# Patient Record
Sex: MALE | Race: WHITE
[De-identification: names, ages, dates, MRNs, and addresses within clinical notes are randomized per-mention and may not be internally consistent; named-entity substitution may affect disease eponyms.]

---

## 2022-10-07 ENCOUNTER — HOSPITAL ENCOUNTER (OUTPATIENT)
Dept: HOSPITAL 95 - LAB | Age: 22
Discharge: HOME | End: 2022-10-07
Attending: ADVANCED PRACTICE MIDWIFE
Payer: COMMERCIAL

## 2022-10-07 DIAGNOSIS — Z11.3: Primary | ICD-10-CM

## 2024-12-09 NOTE — NUR
ADMISSION/SHIFT SUMMARY:
 
PT ADMITTED F/ER, ARRIVES TO UNIT APPROX 1310, TRANSFERS SELF F/GURNEY TO BED
W/OUT DIFFICULTY. PT IS A&Ox4, ANSWERS QUESTIONS APPROPRIATELY, IS COOPERATIVE
W/CARE. PT REPORTS IMPROVED SOB SINCE ADMISSION, THORAVENT CONTINUES TO R
CHEST WALL, SET TO SUCTION AT -30 CMH20, MINIMAL RED OUTPUT IN TUBING. PT
ENDORSES PAIN AROUND INSERTION SITE, MEDICATED x1 PER EMAR, TOLERATING WELL.
DR SEWELL TO BEDSIDE THIS EVENING, CURRENT PLAN IS TO MONITOR PT OVERNIGHT AND
REPEAT CHEST XRAY IN AM. AT THIS TIME, PT IS RESTING IN BED W/VISITORS IN
ROOM. WILL CONTINUE TO MONITOR AND TREAT ACCORDINGLY UNTIL CHANGE OF SHIFT.

## 2024-12-10 NOTE — NUR
SHIFT SUMMARY
PATIENT ALERT AND ORIENTED X4. HAD NO COMPLAINTS OF PAIN OR SHORTNESS OF
BREATH. THORAVENT IN PLACE CONNECTED TO SUCTION. PATIENT PROVIDED WITH
INCENTIVE SPIROMETER AND PATIENT DEMONSTRATED KNKOWLEGE OF USE. PATIENT ON
ROOM AIR WITH SPO2 >90%. VITAL SIGNS STABLE. NO ACUTE ISSUES NOTED OVERNIGHT.
WILL CONTINUE TO MONITOR. CALL LIGHT WITHIN REACH.

## 2024-12-10 NOTE — NUR
SHIFT SUMMARY:
 
NO ACUTE CHANGES THIS SHIFT. PT CONTINUES A&Ox4, ABLE TO MAKE NEEDS KNOWN. PT
REPORTS IMPROVED OVERALL FEELING, DENIES SOB/CP. O2 SATS >95% ON RA. SR ON
MONITOR W/RATE 60s-70s. REPEAT CHEST XRAY SHOWS PNEUMOTHORAX IN NEARLY
RESOLVED. DR SEWELL TO BEDSIDE TODAY TO DISCUSS PLAN OF CARE, PT TO STAY
ANOTHER NIGHT AND REEVALUATE TOMORROW. NO CHANGES TO THORAVENT/SUCTION SET UP,
SUCTION CONTINUES AT -30 CMH2O, NO AIR BUBBLES NOTED. ALL OTHER SYSTEMS WNL.
PT RESTING IN BED W/CALL LIGHT IN REACH.

## 2024-12-11 NOTE — NUR
SHIFT SUMMARY
SINCE ARRIVAL TO UNIT, PT HAS MOVED AROUND WELL IN ROOM w/ NO SOB OR RESP
ISSUES. DENIES PAIN. NO DRAINAGE FROM CT EXCEPT WHAT WAS IN TUBING ON ARRIVAL.
PT PLEASANTLY INSISTANT THAT HE IS GOING HOME TOMORROW.

## 2024-12-11 NOTE — NUR
SHIFT SUMMARY
PATIENT ALERT AND ORIENTED X4. HAD NO COMPLAINTS OF PAIN OR SHORTNESS OF
BREATH. ON ROOM AIR WITH SPO2 >90%. THORAVENT IN PLACE. VITAL SIGNS STABLE. NO
ACUTE ISSUES NOTED OVERNIGHT. WILL CONTINUE TO MONITOR. CALL LIGHT WITHIN
REACH.

## 2024-12-11 NOTE — NUR
ARRIVAL TO SURGICAL UNIT
AMBULATES FROM PCU TO NEW ROOM. R CHEST HAS THORAVENT CT TO SUCTION. SPEAKING
IN FULL SENTENCES, LAUGHING. LUNGS CLEAR T/O. DENIES SOB OR TROUBLE TAKING
DEEP BREATHS. SITTING AT SIDE OF BED.

## 2024-12-12 NOTE — NUR
PT DISCHARGED AT 1626.  PT PROVIDED WITH WRITTEN AND VERBAL DISCHARGE
INSTRUCTIONS.  PT DENIED PAIN.  VSS.  PT REQUESTED THAT STAFF DISCARD OF VAPE
PEN, HE DOES NOT WISH TO TAKE IT HOME.  PT AMBULATED OUT INDEPENDENTLY
APPROXIMATELY 1626

## 2024-12-12 NOTE — NUR
SHIFT SUMMARY
PT ABLE TO SLEEP MOST OF SHIFT. CHEST TUBE STILL HOOKED TO WALL SX AT -30. NO
DRAINAGE NOTED. LUNG SOUNDS CLEAR T/O. PT IND IN THE ROOM. DENIES ANY NEEDS.
PLAN TO WATER SEAL CHEST TUBE THIS AM, THEN GET REPEAT X-RAY IN AFTERNOON.
VSS. NO OTHER CONCERNS AT THIS TIME, CALL LIGHT WITHIN REACH